# Patient Record
Sex: FEMALE | Race: BLACK OR AFRICAN AMERICAN | Employment: OTHER
[De-identification: names, ages, dates, MRNs, and addresses within clinical notes are randomized per-mention and may not be internally consistent; named-entity substitution may affect disease eponyms.]

---

## 2023-04-05 ENCOUNTER — HOSPITAL ENCOUNTER (OUTPATIENT)
Age: 68
End: 2023-04-05
Attending: STUDENT IN AN ORGANIZED HEALTH CARE EDUCATION/TRAINING PROGRAM
Payer: MEDICARE

## 2023-04-05 PROCEDURE — 99282 EMERGENCY DEPT VISIT SF MDM: CPT

## 2023-04-05 NOTE — ED TRIAGE NOTES
Pt here for a mental health evaluation to get some paperwork filled out, denies anxiety or depression, denies auditory or visual hallucinations, denies HI, denies SI, denies having any alcohol , drugs or weapons on her , pt stated she has been trying to get this done with [employer] for 11 years

## 2023-04-05 NOTE — ED PROVIDER NOTES
Sandy Huertas is a 78 y.o. female with no known past medical history with desire for paperwork to be filled out for the [facility]. She states that her license was revoked several years ago and she has been in the process of trying to have it reinstated. She needs a medical provider to certify that she does not have ongoing health issues or mental health issues that would preclude her from being able to drive. She does have a primary care physician. She states that he has attempted to fill out this form multiple times but the forms rejected because he is not a psychiatrist.  She called the hospital and was told to come to the ER to have a psychiatric evaluation to fill out this form. She denies HI, SI, delusions, hallucinations, feels safe at home. Mental Health Problem   Pertinent negatives include no confusion. Functional status baseline:  [EPIC#1537^NOTE}      No past medical history on file. No past surgical history on file. No family history on file. Social History     Socioeconomic History    Marital status:      Spouse name: Not on file    Number of children: Not on file    Years of education: Not on file    Highest education level: Not on file   Occupational History    Not on file   Tobacco Use    Smoking status: Not on file    Smokeless tobacco: Not on file   Substance and Sexual Activity    Alcohol use: Not on file    Drug use: Not on file    Sexual activity: Not on file   Other Topics Concern    Not on file   Social History Narrative    Not on file     Social Determinants of Health     Financial Resource Strain: Not on file   Food Insecurity: Not on file   Transportation Needs: Not on file   Physical Activity: Not on file   Stress: Not on file   Social Connections: Not on file   Intimate Partner Violence: Not on file   Housing Stability: Not on file         ALLERGIES: Patient has no known allergies. Review of Systems   Constitutional:  Negative for chills and fever.    Eyes: Negative for photophobia. Respiratory:  Negative for shortness of breath. Cardiovascular:  Negative for chest pain. Gastrointestinal:  Negative for abdominal pain, nausea and vomiting. Genitourinary:  Negative for dysuria. Musculoskeletal:  Negative for back pain. Neurological:  Negative for headaches. Psychiatric/Behavioral:  Negative for confusion. All other systems reviewed and are negative. Vitals:    04/05/23 1115   BP: (!) 150/110   Pulse: 81   Resp: 16   Temp: 97.6 °F (36.4 °C)   SpO2: 97%            Physical Exam  Constitutional:       General: She is not in acute distress. Appearance: She is not toxic-appearing. HENT:      Head: Normocephalic and atraumatic. Mouth/Throat:      Mouth: Mucous membranes are moist.   Eyes:      Extraocular Movements: Extraocular movements intact. Cardiovascular:      Rate and Rhythm: Normal rate and regular rhythm. Heart sounds: Normal heart sounds. Pulmonary:      Effort: Pulmonary effort is normal. No respiratory distress. Breath sounds: Normal breath sounds. Abdominal:      Palpations: Abdomen is soft. Tenderness: There is no abdominal tenderness. Musculoskeletal:      Cervical back: Normal range of motion. Right lower leg: No edema. Left lower leg: No edema. Skin:     Capillary Refill: Capillary refill takes less than 2 seconds. Neurological:      General: No focal deficit present. Mental Status: She is alert and oriented to person, place, and time. Psychiatric:         Mood and Affect: Mood normal.        Medical Decision Making  Patient does not have any acute psychiatric concerns or complaints. She presented to the ED to have a form filled out. I informed her that we are not the appropriate venue for this to be performed and was given multiple referrals to psychiatric providers so that she can have an outpatient evaluation if this is deemed necessary.   Her primary care may be able to help coordinate this care as well.            Procedures

## 2023-04-05 NOTE — ED NOTES
Discharge instructions given to patient by MD. Pt has been given counseling regarding at home treatment plan. Pt verbalizes understanding of need to seek further treatment if symptoms worsen. Pt ambulated off of unit in no signs of distress.